# Patient Record
Sex: FEMALE | URBAN - METROPOLITAN AREA
[De-identification: names, ages, dates, MRNs, and addresses within clinical notes are randomized per-mention and may not be internally consistent; named-entity substitution may affect disease eponyms.]

---

## 2020-07-08 ENCOUNTER — OFFICE VISIT (OUTPATIENT)
Dept: URBAN - METROPOLITAN AREA CLINIC 74 | Facility: CLINIC | Age: 70
End: 2020-07-08

## 2020-11-02 ENCOUNTER — OFFICE VISIT (OUTPATIENT)
Dept: URBAN - METROPOLITAN AREA TELEHEALTH 2 | Facility: TELEHEALTH | Age: 70
End: 2020-11-02

## 2020-11-03 ENCOUNTER — OFFICE VISIT (OUTPATIENT)
Dept: URBAN - METROPOLITAN AREA TELEHEALTH 2 | Facility: TELEHEALTH | Age: 70
End: 2020-11-03

## 2020-11-04 ENCOUNTER — TELEPHONE ENCOUNTER (OUTPATIENT)
Dept: URBAN - METROPOLITAN AREA CLINIC 92 | Facility: CLINIC | Age: 70
End: 2020-11-04

## 2020-11-13 ENCOUNTER — OFFICE VISIT (OUTPATIENT)
Dept: URBAN - METROPOLITAN AREA TELEHEALTH 2 | Facility: TELEHEALTH | Age: 70
End: 2020-11-13
Payer: COMMERCIAL

## 2020-11-13 DIAGNOSIS — N28.89 KIDNEY MASS: ICD-10-CM

## 2020-11-13 DIAGNOSIS — N28.9 RENAL DISEASE: ICD-10-CM

## 2020-11-13 PROCEDURE — 97802 MEDICAL NUTRITION INDIV IN: CPT | Performed by: DIETITIAN, REGISTERED

## 2020-11-13 NOTE — HPI-OTHER HISTORIES
Pt has stage 3A kidney disease since Nov 2019.  Recent labs WNL.  Na 142, phos 141, protein 64, K 4.4, gluc 102, GFR non AF 51, creatinine 1.10.  BP this morning 118/77 HR 66.  Says she is feeling ok except she has leg pain due to sciatica and is now unable to exercise.  HW was 190lbs 1 year ago.  She is 5'7"  Her current wt is 180lbs.  She tends to eat all meals at home.  Breakfast oats with a little honey and cinnamon 8:30-9:00am.  Lunch at 1-2pm is notmally a lean protein and vegetables such as green beans and red bell pepper.  She is usually not hungry for supper and she eats a piece of fruit.  In the evenings she has another bowl of oatmeal before bed to take with her evening meds at 9:30pm.  She does not have any GI complaints.  Her BM are regular.  She takes daily metamucil.  She is drinking 64oz water.

## 2020-11-24 ENCOUNTER — OFFICE VISIT (OUTPATIENT)
Dept: URBAN - METROPOLITAN AREA TELEHEALTH 2 | Facility: TELEHEALTH | Age: 70
End: 2020-11-24

## 2021-01-11 ENCOUNTER — OFFICE VISIT (OUTPATIENT)
Dept: URBAN - METROPOLITAN AREA TELEHEALTH 2 | Facility: TELEHEALTH | Age: 71
End: 2021-01-11
Payer: COMMERCIAL

## 2021-01-11 DIAGNOSIS — N28.9 KIDNEY DISEASE: ICD-10-CM

## 2021-01-11 PROCEDURE — 97803 MED NUTRITION INDIV SUBSEQ: CPT | Performed by: DIETITIAN, REGISTERED

## 2021-01-11 NOTE — HPI-TODAY'S VISIT:
Nutrition follow up: Pt is doing well.  Eating well and continuing to drink adequate water daily.  Pt is drinking 64 ounces of water per day.  Pt is using a cookbook called The Renal Diet Cook Book.  She uses [employer] website.  She has a question about vitamin D supplement.

## 2021-06-18 ENCOUNTER — OFFICE VISIT (OUTPATIENT)
Dept: URBAN - METROPOLITAN AREA TELEHEALTH 2 | Facility: TELEHEALTH | Age: 71
End: 2021-06-18
Payer: COMMERCIAL

## 2021-06-18 DIAGNOSIS — N18.3 CHRONIC KIDNEY DISEASE (CKD) STAGE G3B/A1, MODERATELY DECREASED GLOMERULAR FILTRATION RATE (GFR) BETWEEN 30-44 ML/MIN/1.73 SQUARE METER AND ALBUMINURIA CREATININE RATIO LESS THAN 30 MG/G: ICD-10-CM

## 2021-06-18 DIAGNOSIS — K59.09 CHANGE IN BOWEL MOVEMENTS INTERMITTENT CONSTIPATION. URGENCY IN THE MORNING.: ICD-10-CM

## 2021-06-18 PROCEDURE — 97803 MED NUTRITION INDIV SUBSEQ: CPT | Performed by: DIETITIAN, REGISTERED

## 2021-06-18 NOTE — HPI-TODAY'S VISIT:
Nutrition F/U visit:  patient c/o constipation.  She is taking a prebiotic fiber supplement dextrin.  64 ounces of water.  diet hx: